# Patient Record
Sex: FEMALE | Race: WHITE | Employment: FULL TIME | URBAN - METROPOLITAN AREA
[De-identification: names, ages, dates, MRNs, and addresses within clinical notes are randomized per-mention and may not be internally consistent; named-entity substitution may affect disease eponyms.]

---

## 2020-05-28 ENCOUNTER — OFFICE VISIT (OUTPATIENT)
Dept: ORTHOPEDIC SURGERY | Age: 35
End: 2020-05-28
Payer: COMMERCIAL

## 2020-05-28 VITALS
TEMPERATURE: 98 F | WEIGHT: 150 LBS | HEIGHT: 66 IN | HEART RATE: 79 BPM | BODY MASS INDEX: 24.11 KG/M2 | SYSTOLIC BLOOD PRESSURE: 100 MMHG | DIASTOLIC BLOOD PRESSURE: 62 MMHG

## 2020-05-28 PROCEDURE — G8420 CALC BMI NORM PARAMETERS: HCPCS | Performed by: PHYSICIAN ASSISTANT

## 2020-05-28 PROCEDURE — 4004F PT TOBACCO SCREEN RCVD TLK: CPT | Performed by: PHYSICIAN ASSISTANT

## 2020-05-28 PROCEDURE — 99204 OFFICE O/P NEW MOD 45 MIN: CPT | Performed by: PHYSICIAN ASSISTANT

## 2020-05-28 PROCEDURE — G8427 DOCREV CUR MEDS BY ELIG CLIN: HCPCS | Performed by: PHYSICIAN ASSISTANT

## 2020-05-28 RX ORDER — TRAMADOL HYDROCHLORIDE 50 MG/1
50 TABLET ORAL EVERY 4 HOURS PRN
Qty: 42 TABLET | Refills: 0 | Status: SHIPPED | OUTPATIENT
Start: 2020-05-28 | End: 2020-06-04

## 2020-05-28 RX ORDER — IBUPROFEN 800 MG/1
800 TABLET ORAL EVERY 6 HOURS PRN
COMMUNITY

## 2020-05-28 NOTE — PROGRESS NOTES
Patient Name: Lynn Yi  : 1985  DOS: 2020        Chief Complaint:   Chief Complaint   Patient presents with    Knee Pain     Left knee       History of Present Illness:  Lynn Yi is a 28 y.o. female who presents with a chief complaint of continued complaints of pain in the knee with irritation with walking, stairs and with overuse. Pain in the knee regularly with swelling and discomfort. Increase in pain over the past several years time. Patient notes she has been due to pain in the left knee for several years and at one point did have a medial meniscectomy/arthroscopy performed by Dr. cooper in 2018 which she noted some improvement after the surgery but since then has been noting increasing decline and increasing pain on the anterior aspect of her knee. She notes that her biggest point to struggle are deep flexion, long periods of standing walking, stairs and inclines. She denies buckling give way trauma fall or injury. Denies numbness burning tingling radiating pains in the leg. Denies utilization of any braces she notes she did have a J brace at one point time however it was very painful and did not provide much benefit. She does utilize ibuprofen approximately 6-7 800 mg tablets throughout the day with so will occasionally get tramadol from Dr. cooper  which she uses at nighttime on really bad days that does help her. She is also utilizing a natural supplement called juice plus which she notes is not helped with her arthritis/knee pain but it has helped with other aspects of her life like energy and sleeping etc.  She does note that she did have cortisone injections at one point time and they did provide significant relief however it lasted a very short time which she states the last time was only approximately 2 weeks in its duration. She denies utilization of viscosupplementation at this point in time. Denies utilization of any assistive walking devices.   She notes ______________  All other systems reviewed and negative     Past Medical History:   Diagnosis Date    Arthritis     Kidney stones      Family History   Problem Relation Age of Onset    Anesth Problems Mother     Arthritis Mother     High Blood Pressure Mother     Arthritis Father     Cancer Maternal Aunt     Cancer Maternal Grandmother     High Blood Pressure Maternal Grandmother     Stroke Maternal Grandmother     Diabetes Maternal Grandfather     Kidney Disease Maternal Grandfather     Stroke Paternal Grandmother      Social History     Socioeconomic History    Marital status:      Spouse name: Not on file    Number of children: Not on file    Years of education: Not on file    Highest education level: Not on file   Occupational History    Not on file   Social Needs    Financial resource strain: Not on file    Food insecurity     Worry: Not on file     Inability: Not on file    Transportation needs     Medical: Not on file     Non-medical: Not on file   Tobacco Use    Smoking status: Current Every Day Smoker     Packs/day: 1.00     Types: Cigarettes    Smokeless tobacco: Never Used   Substance and Sexual Activity    Alcohol use: Not on file    Drug use: Not on file    Sexual activity: Not on file   Lifestyle    Physical activity     Days per week: Not on file     Minutes per session: Not on file    Stress: Not on file   Relationships    Social connections     Talks on phone: Not on file     Gets together: Not on file     Attends Evangelical service: Not on file     Active member of club or organization: Not on file     Attends meetings of clubs or organizations: Not on file     Relationship status: Not on file    Intimate partner violence     Fear of current or ex partner: Not on file     Emotionally abused: Not on file     Physically abused: Not on file     Forced sexual activity: Not on file   Other Topics Concern    Not on file   Social History Narrative    Not on file

## 2020-07-23 ENCOUNTER — OFFICE VISIT (OUTPATIENT)
Dept: ORTHOPEDIC SURGERY | Age: 35
End: 2020-07-23
Payer: COMMERCIAL

## 2020-07-23 VITALS
TEMPERATURE: 98.4 F | BODY MASS INDEX: 24.11 KG/M2 | SYSTOLIC BLOOD PRESSURE: 98 MMHG | HEART RATE: 82 BPM | HEIGHT: 66 IN | DIASTOLIC BLOOD PRESSURE: 69 MMHG | WEIGHT: 150 LBS

## 2020-07-23 PROBLEM — G89.29 CHRONIC PAIN OF LEFT KNEE: Status: ACTIVE | Noted: 2020-07-23

## 2020-07-23 PROBLEM — M25.562 CHRONIC PAIN OF LEFT KNEE: Status: ACTIVE | Noted: 2020-07-23

## 2020-07-23 PROCEDURE — 4004F PT TOBACCO SCREEN RCVD TLK: CPT | Performed by: PHYSICIAN ASSISTANT

## 2020-07-23 PROCEDURE — G8427 DOCREV CUR MEDS BY ELIG CLIN: HCPCS | Performed by: PHYSICIAN ASSISTANT

## 2020-07-23 PROCEDURE — 99214 OFFICE O/P EST MOD 30 MIN: CPT | Performed by: PHYSICIAN ASSISTANT

## 2020-07-23 PROCEDURE — G8420 CALC BMI NORM PARAMETERS: HCPCS | Performed by: PHYSICIAN ASSISTANT

## 2020-07-23 RX ORDER — TRAMADOL HYDROCHLORIDE 50 MG/1
50 TABLET ORAL EVERY 4 HOURS PRN
Qty: 42 TABLET | Refills: 0 | Status: SHIPPED | OUTPATIENT
Start: 2020-07-23 | End: 2020-07-30

## 2020-07-23 NOTE — PROGRESS NOTES
Patient Name: Mamadou Knutson  : 1985  DOS: 2020        Chief Complaint:   Chief Complaint   Patient presents with    Knee Pain     LEFT KNEE       History of Present Illness:  Mamadou Knutson is a 28 y.o. female who presents with a chief complaint of continued complaints of pain in the knee with irritation with walking, stairs and with overuse. Pain in the knee regularly with swelling and discomfort. Increase in pain over the past several years time. Currently: 2020  Patient is here for follow-up regarding left knee pain and osteoarthritis. She notes improvement in her pain since previous visit with utilization of the biomed SpeedGel as well as utilization of the Cho-Pat  knee strap brace. Patient notes she has been utilizing the tramadol at night during days that are really bad and is hoping to obtain a refill at today's visit. 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 She notes that at this point time she is ready to move forward with pursuing physical therapy that was recommended at the previous visit but she notes that the time she did not have time for. Rates her overall pain is a 5-6 out of 10. She denies any falls trauma or injury denies numbness burning tingling radiating pains down the leg. Previously: 2020  Patient notes she has been due to pain in the left knee for several years and at one point did have a medial meniscectomy/arthroscopy performed by Dr. cooper in 2018 which she noted some improvement after the surgery but since then has been noting increasing decline and increasing pain on the anterior aspect of her knee.   She notes that her biggest point to struggle are deep flexion, long periods of standing 7/23/20 7/30/20 Yes NAYAN Harris   ibuprofen (ADVIL;MOTRIN) 800 MG tablet Take 800 mg by mouth every 6 hours as needed for Pain    Historical Provider, MD   diclofenac (VOLTAREN) 50 MG EC tablet Take 1 tablet by mouth 2 times daily (with meals) 5/28/20   NAYAN Harris   Diclofenac Sodium POWD 2 g by Does not apply route 4 times daily Formula #8E Baclo 2% Diclo 3% DMSO 5% Oscar 6% Lido 2% Prilo 2% 5/28/20   NAYAN Harris     Allergies: Allergies   Allergen Reactions    Latex Hives    Percocet [Oxycodone-Acetaminophen]      vomit    Sulfa Antibiotics Hives    Vicodin [Hydrocodone-Acetaminophen]      vomit    Promethazine Nausea And Vomiting       Review of Systems:     Review of Systems ______  Constitutional: Negative for fever and diaphoresis. ____________  Respiratory: Negative for shortness of breath.  ________  Gastrointestinal: Negative for abdominal pain. ________  Musculoskeletal: Positive for joint pain. ____  Skin: Negative for itching. ____  Neurological: Negative for loss of consciousness.  ______________  All other systems reviewed and negative     Past Medical History:   Diagnosis Date    Arthritis     Kidney stones      Family History   Problem Relation Age of Onset    Anesth Problems Mother     Arthritis Mother     High Blood Pressure Mother     Arthritis Father     Cancer Maternal Aunt     Cancer Maternal Grandmother     High Blood Pressure Maternal Grandmother     Stroke Maternal Grandmother     Diabetes Maternal Grandfather     Kidney Disease Maternal Grandfather     Stroke Paternal Grandmother      Social History     Socioeconomic History    Marital status:      Spouse name: Not on file    Number of children: Not on file    Years of education: Not on file    Highest education level: Not on file   Occupational History    Not on file   Social Needs    Financial resource strain: Not on file    Food insecurity     Worry: Not on file     Inability: Not on file   Home D[facility] needs     Medical: Not on file     Non-medical: Not on file   Tobacco Use    Smoking status: Current Every Day Smoker     Packs/day: 1.00     Types: Cigarettes    Smokeless tobacco: Never Used   Substance and Sexual Activity    Alcohol use: Not on file    Drug use: Not on file    Sexual activity: Not on file   Lifestyle    Physical activity     Days per week: Not on file     Minutes per session: Not on file    Stress: Not on file   Relationships    Social connections     Talks on phone: Not on file     Gets together: Not on file     Attends Jainism service: Not on file     Active member of club or organization: Not on file     Attends meetings of clubs or organizations: Not on file     Relationship status: Not on file    Intimate partner violence     Fear of current or ex partner: Not on file     Emotionally abused: Not on file     Physically abused: Not on file     Forced sexual activity: Not on file   Other Topics Concern    Not on file   Social History Narrative    Not on file         Physical Exam __  Constitutional: She is oriented to person, place, and time and well-developed, well-nourished, and in no distress. No distress. ____  HENT:   Head: Normocephalic and atraumatic. ____  Eyes: Conjunctivae are normal. ________  Cardiovascular: Intact distal pulses. ____  Pulmonary/Chest: Effort normal. ________________________  Neurological: She is alert and oriented to person, place, and time. ____  Skin: Skin is dry. She is not diaphoretic. ____  Psychiatric: Mood, affect and judgment normal. ______          Assessment   Vital Signs:      Vitals:    07/23/20 0931   BP: 98/69   Pulse: 82   Temp: 98.4 °F (36.9 °C)       left Knee shows evidence for DJD with no obvious pseudolaxity, pain with weight bearing, antalgic gait and palpable osteophytes. Inspection: Mild anterior swelling. Swelling is present with no effusion.  The posterior aspect of the knee does not appears to be full with minimal tenderness. There is no erythema, rash, or ecchymosis. Range of Motion:  Right 0-120 left 0-120    No significant pain with wrist or valgus testing    There is no noted deformity    Strength:  Hamstrings rated: 4/5. Quadriceps rated: 4/5    Palpation: There is mild to moderate tenderness along the patellofemoral joint line. Special Tests: Patellar Compression test is positive. Valgus & Varus test is positive. Skin: There are no rashes, ulcerations or lesions. Gait: Gait pattern is antalgic  Skin shows no rashes/ecchymosis to the affected area, no hyperesthesias, no discoloration, no temperature or color discrepancies. NEUROLOGICALLY: There is no evidence for sensory or motor deficits in the extremity. Coordination appears full with no spacticity or rigidity. Reflexes appear to be symmetric. Distal circulation intact. No signs of RSD. Additional Comments: Hip range of motion within normal limits. Procedure(s): No new procedure performed at today's date    Diagnostic Test Findings:   Xray   No new x-rays performed today's date. 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 Intact ACL PCL MCL and LCL. Noted degeneration of the medial meniscus with evidence of previous meniscal repair. No lateral meniscal degeneration noted. No bone marrow edema noted. Assessment and Plan:       Diagnosis Orders   1.  Chronic pain of left knee  traMADol (ULTRAM) 50 MG tablet    Ambulatory referral to Physical Therapy              The orders below, if any, were placed during this visit:   Orders Placed This Encounter   Procedures    Ambulatory referral to Physical Therapy     Referral Priority:   Routine     Referral Type:   Eval and Treat     Requested Specialty:   Physical Therapy     Number of Visits Requested:   1              Treatment Plan:   -Advised patient to continue to utilize Cho-Pat knee strap brace when she is active standing or going to be doing lots of stairs or walking.  -Gave patient prescription for tramadol 50 mg 1 every 6-8 hours as needed pain quantity 42 0 refills  -Advised for patient to continue prescription for diclofenac 50 mg 1 twice daily as needed pain advised patient to stop utilization of ibuprofen while utilizing the diclofenac. -Gave patient prescription for physical therapy for evaluation and treatment of patellofemoral arthritis/pain with utilization of BFR therapy with Smith protocol.    -Advised patient follow-up in 2 months for repeat evaluation  -Did discuss the potential of repeating corticosteroid injections or Visco supplementation injections if need be.          NAYAN Gillespie